# Patient Record
Sex: FEMALE | URBAN - METROPOLITAN AREA
[De-identification: names, ages, dates, MRNs, and addresses within clinical notes are randomized per-mention and may not be internally consistent; named-entity substitution may affect disease eponyms.]

---

## 2018-10-10 ENCOUNTER — HOSPITAL ENCOUNTER (OUTPATIENT)
Dept: RADIOLOGY | Facility: OTHER | Age: 53
Discharge: HOME | End: 2018-10-10
Attending: PLASTIC SURGERY
Payer: COMMERCIAL

## 2018-10-10 ENCOUNTER — OFFICE VISIT (OUTPATIENT)
Dept: ORTHOPEDICS | Facility: CLINIC | Age: 53
End: 2018-10-10
Payer: COMMERCIAL

## 2018-10-10 VITALS
BODY MASS INDEX: 22.66 KG/M2 | SYSTOLIC BLOOD PRESSURE: 112 MMHG | HEIGHT: 61 IN | DIASTOLIC BLOOD PRESSURE: 70 MMHG | WEIGHT: 120 LBS | HEART RATE: 58 BPM

## 2018-10-10 DIAGNOSIS — M77.11 LATERAL EPICONDYLITIS OF BOTH ELBOWS: Primary | ICD-10-CM

## 2018-10-10 DIAGNOSIS — M25.532 BILATERAL WRIST PAIN: ICD-10-CM

## 2018-10-10 DIAGNOSIS — M65.342 ACQUIRED TRIGGER FINGER OF LEFT RING FINGER: ICD-10-CM

## 2018-10-10 DIAGNOSIS — G56.03 BILATERAL CARPAL TUNNEL SYNDROME: ICD-10-CM

## 2018-10-10 DIAGNOSIS — M25.531 BILATERAL WRIST PAIN: ICD-10-CM

## 2018-10-10 DIAGNOSIS — M25.532 BILATERAL WRIST PAIN: Primary | ICD-10-CM

## 2018-10-10 DIAGNOSIS — M25.531 BILATERAL WRIST PAIN: Primary | ICD-10-CM

## 2018-10-10 DIAGNOSIS — M77.12 LATERAL EPICONDYLITIS OF BOTH ELBOWS: Primary | ICD-10-CM

## 2018-10-10 PROCEDURE — 99203 OFFICE O/P NEW LOW 30 MIN: CPT | Mod: 25,S$GLB,, | Performed by: PLASTIC SURGERY

## 2018-10-10 PROCEDURE — 3008F BODY MASS INDEX DOCD: CPT | Mod: CPTII,S$GLB,, | Performed by: PLASTIC SURGERY

## 2018-10-10 PROCEDURE — 73110 X-RAY EXAM OF WRIST: CPT | Mod: 26,50,, | Performed by: RADIOLOGY

## 2018-10-10 PROCEDURE — 99999 PR PBB SHADOW E&M-EST. PATIENT-LVL III: CPT | Mod: PBBFAC,,, | Performed by: PLASTIC SURGERY

## 2018-10-10 PROCEDURE — 20526 THER INJECTION CARP TUNNEL: CPT | Mod: 50,S$GLB,, | Performed by: PLASTIC SURGERY

## 2018-10-10 PROCEDURE — 73110 X-RAY EXAM OF WRIST: CPT | Mod: 50,TC,FY

## 2018-10-10 PROCEDURE — 20550 NJX 1 TENDON SHEATH/LIGAMENT: CPT | Mod: 51,F3,S$GLB, | Performed by: PLASTIC SURGERY

## 2018-10-10 RX ORDER — LANOLIN ALCOHOL/MO/W.PET/CERES
100 CREAM (GRAM) TOPICAL DAILY
COMMUNITY

## 2018-10-10 RX ORDER — TRIAMCINOLONE ACETONIDE 40 MG/ML
60 INJECTION, SUSPENSION INTRA-ARTICULAR; INTRAMUSCULAR
Status: COMPLETED | OUTPATIENT
Start: 2018-10-10 | End: 2018-10-10

## 2018-10-10 RX ORDER — IBUPROFEN 200 MG
600 TABLET ORAL EVERY 6 HOURS PRN
COMMUNITY

## 2018-10-10 RX ADMIN — TRIAMCINOLONE ACETONIDE 60 MG: 40 INJECTION, SUSPENSION INTRA-ARTICULAR; INTRAMUSCULAR at 12:10

## 2018-10-10 NOTE — PROGRESS NOTES
HISTORY OF PRESENT ILLNESS:  Ms. Burger is a 52-year-old right-hand dominant   Cuban-speaking female who presented with an  today, who complains   of bilateral hand numbness and tingling that has been going on for the past six   to seven years.  She does have a positive EMG, which was performed at [facility] where   she was referred to a hand specialist.  She does have nocturnal symptoms.  She   states that her symptoms come and go at night.  She also complains of bilateral   lateral elbow pain.  She works as a  or  and in addition she   states that she has difficulty with flexing the left ring finger with no active   triggering.  She has not had any previous interventions such as injections.  She   has tried night splinting with little help.  She has no other complaints today.    History reviewed. No pertinent past medical history.    History reviewed. No pertinent surgical history.    Social History     Socioeconomic History    Marital status: Unknown     Spouse name: Not on file    Number of children: Not on file    Years of education: Not on file    Highest education level: Not on file   Social Needs    Financial resource strain: Not on file    Food insecurity - worry: Not on file    Food insecurity - inability: Not on file    Transportation needs - medical: Not on file    Transportation needs - non-medical: Not on file   Occupational History    Not on file   Tobacco Use    Smoking status: Not on file   Substance and Sexual Activity    Alcohol use: Not on file    Drug use: Not on file    Sexual activity: Not on file   Other Topics Concern    Not on file   Social History Narrative    Not on file       Current Outpatient Medications on File Prior to Visit   Medication Sig Dispense Refill    cyanocobalamin (VITAMIN B-12) 1000 MCG tablet Take 100 mcg by mouth once daily.      ibuprofen (ADVIL,MOTRIN) 200 MG tablet Take 600 mg by mouth every 6 (six) hours as needed for Pain.   "    vitamin E 100 UNIT capsule Take 100 Units by mouth once daily.       No current facility-administered medications on file prior to visit.        Review of patient's allergies indicates:  No Known Allergies    Review of Systems:  Constitutional: no fever or chills  ENT: no nasal congestion or sore throat  Respiratory: no cough or shortness of breath  Cardiovascular: no chest pain or palpitations  Gastrointestinal: no nausea or vomiting  Genitourinary: no hematuria or dysuria  Integument/Breast: no rash or pruritis  Hematologic/Lymphatic: no easy bruising or lymphadenopathy  Musculoskeletal: see HPI  Neurological: no seizures or tremors  Behavioral/Psych: no auditory or visual hallucinations      PHYSICAL EXAM    Vitals:    10/10/18 1001   BP: 112/70   Pulse: (!) 58   Weight: 54.4 kg (120 lb)   Height: 5' 1" (1.549 m)   PainSc:   8   PainLoc: Hand         PHYSICAL EXAMINATION:  GENERAL APPEARANCE:  No acute distress, alert and oriented x3, cooperative, well   nourished.  LUNGS:  Unlabored on room air.  CARDIOVASCULAR:  Distal pulses intact.  Good capillary refill.  No clubbing,   cyanosis or edema.  EXTREMITIES:  Right upper extremity, positive tenderness over the lateral   epicondyle to deep palpation.  She has a positive Tinel's and Durkan sign over   the carpal tunnel.  She has full active range of motion of all digits at all   joint.  She is neurovascularly intact in the median, radial and ulnar   distributions.  No thenar or hypothenar atrophy.  Left upper extremity:    Tenderness over the lateral epicondyle to deep palpation.  Positive Tinel's and   Durkan sign over the carpal tunnel.  No thenar or hypothenar atrophy.  She is   able to make a composite fist with tenderness over the A1 pulley of the left   ring finger.  No active triggering.  Normal sensation in the median, radial and   ulnar distributions.    RADIOLOGY IMPRESSION:   EXAMINATION:  XR WRIST COMPLETE 3 VIEWS BILATERAL    CLINICAL " HISTORY:  Pain in right wrist    TECHNIQUE:  PA, lateral, and oblique views of both wrists were performed.    COMPARISON:  None    FINDINGS:  The alignment is within normal limits.  No fracture.  No marrow replacement process.  The joint spaces are fairly well maintained.      Impression       No osseous abnormalities.         EMG IMPRESSION:  Right carpal tunnel syndrome and mild left carpal tunnel   syndrome.    PROCEDURES:  1. I have explained the risks, benefits, and alternatives of the procedure in detail.  The patient voices understanding and all questions have been answered.  The patient agrees to proceed as planned. After a sterile prep of the skin the bilateral carpal tunnel is injected from the volar approach using a 25 gauge needle with a combination of 1cc 1% plain xylocaine and 20 mg of Kenalog each.  The patient is cautioned and immediate relief of pain is secondary to the local anesthetic and will be temporary.  After the anesthetic wears off there may be a increase in pain that may last for a few hours or a few days and they should use ice to help alleviate this flair up of pain.   2.  PROCEDURE:  I have explained the risks, benefits, and alternatives of the procedure in detail.  The patient voices understanding and all questions have been answered. So after I performed a sterile prep of the skin, the level of there A-1 pulley of the left ring finger is injected using a 25 gauge needle from the volar approach with a  combination of 1cc 1% plain Lidocaine and 20 mg of Kenalog.  The patient is cautioned and immediate relief of pain is secondary to the local anesthetic and will be temporary.  After the anesthetic wears off there may be a increase in pain that may last for a few hours or a few days and they should use ice to help alleviate this flair up of pain.         ASSESSMENT:  1.  Bilateral carpal tunnel syndrome.  2.  Bilateral lateral epicondylitis.  3.  Stage I left ring finger trigger  digit.    PLAN:  I discussed the pathophysiology, progression and treatment of each of   these conditions in detail.  We discussed conservative treatment options, which   include stretching and rest for the lateral epicondylitis as well as splinting   at night for the carpal tunnel.  She was offered injections to the carpal tunnel   and the trigger digit today to help alleviate her symptoms.  I discussed that   if her symptoms fail to improve or worsen in the eight weeks she may return to   clinic for further evaluation and treatment.  Otherwise, she may follow up   p.r.n.  All questions and concerns were addressed prior to discharge.      SCOTTIE/REJI  dd: 10/10/2018 12:01:20 (CDT)  td: 10/10/2018 22:28:38 (CDT)  Doc ID   #[id number]  Job ID #[id number]    CC:       Dictation Confirmation Code: [id number]  Shola Garcia Jr. MD  10/10/2018  11:56 AM

## 2018-10-10 NOTE — LETTER
October 10, 2018      Ryanne Bhatia, TEOFILOP-C  [address] [facility] [address] [facility] [address] [place] [address]           [facility]  [address]  [facility] [address]  Phone: [phone]          Patient: Aleisha Burger   MR Number: [id number]   YOB: 1965   Date of Visit: 10/10/2018       Dear Ryanne Bhatia:    Thank you for referring Aleisha Burger to me for evaluation. Attached you will find relevant portions of my assessment and plan of care.    If you have questions, please do not hesitate to call me. I look forward to following Aleisha Burger along with you.    Sincerely,    Shola Garcia Jr., MD    Enclosure  CC:  No Recipients    If you would like to receive this communication electronically, please contact [email or web address] or [phone] to request more information on [facility] access.    For providers and/or their staff who would like to refer a patient to [facility], please contact us through our one-stop-shop provider referral line, [facility] , at [phone].    If you feel you have received this communication in error or would no longer like to receive these types of communications, please e-mail [email or web address]